# Patient Record
Sex: MALE | Race: OTHER | Employment: FULL TIME | URBAN - METROPOLITAN AREA
[De-identification: names, ages, dates, MRNs, and addresses within clinical notes are randomized per-mention and may not be internally consistent; named-entity substitution may affect disease eponyms.]

---

## 2022-05-01 ENCOUNTER — APPOINTMENT (OUTPATIENT)
Dept: CT IMAGING | Age: 57
End: 2022-05-01
Payer: COMMERCIAL

## 2022-05-01 ENCOUNTER — HOSPITAL ENCOUNTER (EMERGENCY)
Age: 57
Discharge: HOME | End: 2022-05-01
Attending: EMERGENCY MEDICINE
Payer: COMMERCIAL

## 2022-05-01 VITALS
HEIGHT: 72 IN | RESPIRATION RATE: 22 BRPM | HEART RATE: 57 BPM | DIASTOLIC BLOOD PRESSURE: 60 MMHG | OXYGEN SATURATION: 96 % | BODY MASS INDEX: 25.06 KG/M2 | WEIGHT: 185 LBS | SYSTOLIC BLOOD PRESSURE: 94 MMHG | TEMPERATURE: 98.2 F

## 2022-05-01 DIAGNOSIS — F10.920 ACUTE ALCOHOLIC INTOXICATION WITHOUT COMPLICATION (HCC): Primary | ICD-10-CM

## 2022-05-01 LAB
AMPHETAMINE SCREEN, URINE: ABNORMAL
ANION GAP SERPL CALCULATED.3IONS-SCNC: 19 MMOL/L (ref 3–16)
BARBITURATE SCREEN URINE: ABNORMAL
BENZODIAZEPINE SCREEN, URINE: POSITIVE
BUN BLDV-MCNC: 9 MG/DL (ref 7–20)
CALCIUM SERPL-MCNC: 11 MG/DL (ref 8.3–10.6)
CANNABINOID SCREEN URINE: POSITIVE
CHLORIDE BLD-SCNC: 92 MMOL/L (ref 99–110)
CO2: 19 MMOL/L (ref 21–32)
COCAINE METABOLITE SCREEN URINE: ABNORMAL
CREAT SERPL-MCNC: 0.9 MG/DL (ref 0.9–1.3)
EKG ATRIAL RATE: 56 BPM
EKG DIAGNOSIS: NORMAL
EKG P AXIS: 33 DEGREES
EKG P-R INTERVAL: 144 MS
EKG Q-T INTERVAL: 448 MS
EKG QRS DURATION: 78 MS
EKG QTC CALCULATION (BAZETT): 432 MS
EKG R AXIS: 53 DEGREES
EKG T AXIS: 47 DEGREES
EKG VENTRICULAR RATE: 56 BPM
ETHANOL: 309 MG/DL (ref 0–0.08)
GFR AFRICAN AMERICAN: >60
GFR NON-AFRICAN AMERICAN: >60
GLUCOSE BLD-MCNC: 100 MG/DL (ref 70–99)
Lab: ABNORMAL
METHADONE SCREEN, URINE: ABNORMAL
OPIATE SCREEN URINE: ABNORMAL
OXYCODONE URINE: ABNORMAL
PH UA: 6
PHENCYCLIDINE SCREEN URINE: ABNORMAL
POTASSIUM REFLEX MAGNESIUM: 4.3 MMOL/L (ref 3.5–5.1)
PROPOXYPHENE SCREEN: ABNORMAL
SODIUM BLD-SCNC: 130 MMOL/L (ref 136–145)

## 2022-05-01 PROCEDURE — 70450 CT HEAD/BRAIN W/O DYE: CPT

## 2022-05-01 PROCEDURE — 6360000002 HC RX W HCPCS

## 2022-05-01 PROCEDURE — 36415 COLL VENOUS BLD VENIPUNCTURE: CPT

## 2022-05-01 PROCEDURE — 96372 THER/PROPH/DIAG INJ SC/IM: CPT

## 2022-05-01 PROCEDURE — 99284 EMERGENCY DEPT VISIT MOD MDM: CPT

## 2022-05-01 PROCEDURE — 80048 BASIC METABOLIC PNL TOTAL CA: CPT

## 2022-05-01 PROCEDURE — 82077 ASSAY SPEC XCP UR&BREATH IA: CPT

## 2022-05-01 PROCEDURE — 72125 CT NECK SPINE W/O DYE: CPT

## 2022-05-01 PROCEDURE — 6360000002 HC RX W HCPCS: Performed by: EMERGENCY MEDICINE

## 2022-05-01 PROCEDURE — 80307 DRUG TEST PRSMV CHEM ANLYZR: CPT

## 2022-05-01 RX ORDER — MIDAZOLAM HYDROCHLORIDE 2 MG/2ML
5 INJECTION, SOLUTION INTRAMUSCULAR; INTRAVENOUS ONCE
Status: COMPLETED | OUTPATIENT
Start: 2022-05-01 | End: 2022-05-01

## 2022-05-01 RX ORDER — HALOPERIDOL 5 MG/ML
5 INJECTION INTRAMUSCULAR ONCE
Status: COMPLETED | OUTPATIENT
Start: 2022-05-01 | End: 2022-05-01

## 2022-05-01 RX ORDER — DIPHENHYDRAMINE HYDROCHLORIDE 50 MG/ML
INJECTION INTRAMUSCULAR; INTRAVENOUS
Status: COMPLETED
Start: 2022-05-01 | End: 2022-05-01

## 2022-05-01 RX ORDER — LORAZEPAM 2 MG/ML
2 INJECTION INTRAMUSCULAR EVERY 6 HOURS PRN
Status: DISCONTINUED | OUTPATIENT
Start: 2022-05-01 | End: 2022-05-01

## 2022-05-01 RX ORDER — LORAZEPAM 2 MG/ML
2 INJECTION INTRAMUSCULAR ONCE
Status: COMPLETED | OUTPATIENT
Start: 2022-05-01 | End: 2022-05-01

## 2022-05-01 RX ORDER — DIPHENHYDRAMINE HYDROCHLORIDE 50 MG/ML
50 INJECTION INTRAMUSCULAR; INTRAVENOUS ONCE
Status: COMPLETED | OUTPATIENT
Start: 2022-05-01 | End: 2022-05-01

## 2022-05-01 RX ORDER — HALOPERIDOL 5 MG/ML
INJECTION INTRAMUSCULAR
Status: COMPLETED
Start: 2022-05-01 | End: 2022-05-01

## 2022-05-01 RX ORDER — LORAZEPAM 2 MG/ML
INJECTION INTRAMUSCULAR
Status: COMPLETED
Start: 2022-05-01 | End: 2022-05-01

## 2022-05-01 RX ORDER — MIDAZOLAM HYDROCHLORIDE 2 MG/2ML
5 INJECTION, SOLUTION INTRAMUSCULAR; INTRAVENOUS ONCE
Status: DISCONTINUED | OUTPATIENT
Start: 2022-05-01 | End: 2022-05-01 | Stop reason: HOSPADM

## 2022-05-01 RX ADMIN — LORAZEPAM 2 MG: 2 INJECTION INTRAMUSCULAR; INTRAVENOUS at 01:28

## 2022-05-01 RX ADMIN — LORAZEPAM 2 MG: 2 INJECTION INTRAMUSCULAR at 01:28

## 2022-05-01 RX ADMIN — DIPHENHYDRAMINE HYDROCHLORIDE 50 MG: 50 INJECTION INTRAMUSCULAR; INTRAVENOUS at 01:29

## 2022-05-01 RX ADMIN — HALOPERIDOL LACTATE 5 MG: 5 INJECTION, SOLUTION INTRAMUSCULAR at 01:23

## 2022-05-01 RX ADMIN — HALOPERIDOL LACTATE 5 MG: 5 INJECTION, SOLUTION INTRAMUSCULAR at 01:42

## 2022-05-01 RX ADMIN — DIPHENHYDRAMINE HYDROCHLORIDE 50 MG: 50 INJECTION, SOLUTION INTRAMUSCULAR; INTRAVENOUS at 01:29

## 2022-05-01 RX ADMIN — MIDAZOLAM HYDROCHLORIDE 5 MG: 1 INJECTION, SOLUTION INTRAMUSCULAR; INTRAVENOUS at 01:43

## 2022-05-01 RX ADMIN — HALOPERIDOL 5 MG: 5 INJECTION INTRAMUSCULAR at 01:23

## 2022-05-01 RX ADMIN — HALOPERIDOL 5 MG: 5 INJECTION INTRAMUSCULAR at 01:42

## 2022-05-01 ASSESSMENT — PAIN - FUNCTIONAL ASSESSMENT: PAIN_FUNCTIONAL_ASSESSMENT: 0-10

## 2022-05-01 ASSESSMENT — PAIN SCALES - GENERAL: PAINLEVEL_OUTOF10: 0

## 2022-05-01 ASSESSMENT — LIFESTYLE VARIABLES
HOW OFTEN DO YOU HAVE A DRINK CONTAINING ALCOHOL: PATIENT DECLINED
HOW MANY STANDARD DRINKS CONTAINING ALCOHOL DO YOU HAVE ON A TYPICAL DAY: PATIENT DECLINED

## 2022-05-01 NOTE — ED NOTES
Report received from [facility]. Resting in bed, lying on left side. Vitals stable at this time.      Leonardo Maza RN  05/01/22 7937

## 2022-05-01 NOTE — ED PROVIDER NOTES
This patient was seen by the Mid-Level Provider. I have seen and examined the patient, agree with the workup, evaluation, management and diagnosis. Care plan has been discussed. My assessment reveals a 77-year-old male who presented intoxicated. This is a 77-year-old male turned over to me by Dr. Nighat Mendoza, please see his note for further details. Patient was seen earlier evening by midlevel Marci Cox and Dr. Valente Dorman. Apparently the patient was found running around naked at a local hotel and intoxicated and brought here for further evaluation. The patient was turned over to me while he was sleeping to be reevaluated when he woke up. The patient's initial work-up showed a blood alcohol of over 300 and a CT that was unremarkable and negative. The urine drug profile was positive for benzodiazepines and cannabis. Apparently, the patient initially had to be sedated because he was combative. Radiology results:    CT CERVICAL SPINE WO CONTRAST   Final Result   No acute abnormality of the cervical spine. CT HEAD WO CONTRAST   Final Result   No acute intracranial abnormality. LABS:    Labs Reviewed   URINE DRUG SCREEN - Abnormal; Notable for the following components:       Result Value    Benzodiazepine Screen, Urine POSITIVE (*)     Cannabinoid Scrn, Ur POSITIVE (*)     All other components within normal limits   BASIC METABOLIC PANEL W/ REFLEX TO MG FOR LOW K - Abnormal; Notable for the following components:    Sodium 130 (*)     Chloride 92 (*)     CO2 19 (*)     Anion Gap 19 (*)     Glucose 100 (*)     Calcium 11.0 (*)     All other components within normal limits   ETHANOL           EKG:    Sinus bradycardia at a rate of 56 beats a minute with no acute ST elevations or depressions or pathologic Q waves. Exam:    Well-nourished male in no acute distress. He was initially sleeping. The patient had a small abrasion on his left forehead area.   After my initial evaluation I observed the patient for over 48 hours and he is now walking around the ER and going to the bathroom. On reexamination he is conversive, very nice and alert and oriented x3. He is ambulating normally with no focal findings. Medical decision making:    Well-nourished male in no acute distress. He was initially sleeping. The patient had a small abrasion on his left forehead area. After my initial evaluation I observed the patient for over 48 hours and he is now walking around the ER and going to the bathroom. On reexamination he is conversive, very nice and alert and oriented x3. He admits to drinking too much vodka last evening. He denies any other complaints. He is neurologically intact alert and oriented x3. We are helping the patient to find transportation back to his facility. He is to return for any other problems or worsening symptoms. FINAL IMPRESSION:    1.  Acute alcoholic intoxication without complication ([facility].)           Shelbi Thomas MD  05/01/22 1049

## 2022-05-01 NOTE — ED NOTES
Patient yelling hello. RN at bedside. Patient sitting up asking what is going on. Patient informed the events that took place. He is able to state his name, , and where he lives. Given gown and pants. Patient able to walk to bathroom - steady on feet. Dr. Sena Fuentes informed and is at bedside.       Buzz Robertson RN  22 [phone]

## 2022-05-01 NOTE — ED NOTES
Patient discharged in stable condition. Instructions reviewed. Given opportunity to ask questions if needed and patient verbalized understanding. All questions answered. Patient walked to lobby waiting for his friend to pick him up.         Charlee Del Angel RN  05/01/22 2615

## 2022-05-01 NOTE — ED NOTES
IV removed from R hand. No complications. Patient attempting to make phone calls for .       Melissa Moffett RN  05/01/22 9931

## 2022-05-01 NOTE — ED PROVIDER NOTES
[facility]        Pt Name: Antonio Ferrer  MRN: [id number]  [place] 1965  Date of evaluation: 5/1/2022  Provider: Yahir Fernández PA-C  PCP: No primary care provider on file. Note Started: 2:27 AM EDT        I have seen and evaluated this patient with my supervising physician Rolf Cast MD.    [facility]       Chief Complaint   Patient presents with    Alcohol Intoxication     Pt found running naked at [address] near room 134. Per [employer] squad all VSS. Unsure of Pt ID at this time. HISTORY OF PRESENT ILLNESS   (Location, Timing/Onset, Context/Setting, Quality, Duration, Modifying Factors, Severity, Associated Signs and Symptoms)  Note limiting factors. Chief Complaint: Altered mental status    Antonio Ferrer is a 62 y.o. male who presents to the emergency department today for evaluation for altered mental status, patient arrives by EMS, patient apparently was running naked in the [address], concern for polysubstance abuse. Patient does have an abrasion noted to the left forehead, however he is unsure what happened to him. Patient refuses to tell me his name, birthday, he refuses to tell me what substances he is taking tonight, just endorses alcohol. No other history is able to be obtained    Nursing Notes were all reviewed and agreed with or any disagreements were addressed in the HPI. REVIEW OF SYSTEMS    (2-9 systems for level 4, 10 or more for level 5)     Review of Systems   Unable to perform ROS: Mental status change       Positives and Pertinent negatives as per HPI. Except as noted above in the ROS, all other systems were reviewed and negative. PAST MEDICAL HISTORY   History reviewed. No pertinent past medical history. SURGICAL HISTORY   History reviewed. No pertinent surgical history.       CURRENTMEDICATIONS       There are no discharge medications for this patient. ALLERGIES     Patient has no known allergies. FAMILYHISTORY     History reviewed. No pertinent family history. SOCIAL HISTORY       Social History     Tobacco Use    Smoking status: Never Smoker    Smokeless tobacco: Never Used   Vaping Use    Vaping Use: Never used   Substance Use Topics    Alcohol use: Yes     Comment: Keeps stating 2 secs    Drug use: Never       SCREENINGS    [place] Coma Scale  Eye Opening: Spontaneous  Best Verbal Response: Oriented  Best Motor Response: Obeys commands  [place]fe Coma Scale Score: 15        PHYSICAL EXAM    (up to 7 for level 4, 8 or more for level 5)     ED Triage Vitals [05/01/22 0113]   BP Temp Temp Source Pulse Resp SpO2 Height Weight   (!) 144/94 98.2 °F (36.8 °C) Oral 85 16 97 % 6' (1.829 m) 185 lb (83.9 kg)       Physical Exam  Vitals and nursing note reviewed. Constitutional:       Appearance: He is well-developed. He is not diaphoretic. HENT:      Head: Normocephalic and atraumatic. Comments: Hematoma noted to the left forehead, no overlying abrasion. Right Ear: External ear normal.      Left Ear: External ear normal.      Nose: Nose normal.   Eyes:      General:         Right eye: No discharge. Left eye: No discharge. Extraocular Movements: Extraocular movements intact. Conjunctiva/sclera: Conjunctivae normal.      Pupils: Pupils are equal, round, and reactive to light. Neck:      Trachea: No tracheal deviation. Cardiovascular:      Rate and Rhythm: Normal rate and regular rhythm. Pulmonary:      Effort: Pulmonary effort is normal. No respiratory distress. Breath sounds: Normal breath sounds. No wheezing or rales. Abdominal:      General: Bowel sounds are normal. There is no distension. Palpations: Abdomen is soft. Tenderness: There is no abdominal tenderness. There is no guarding. Musculoskeletal:         General: Normal range of motion.       Cervical back: Normal range of motion and neck supple. Skin:     General: Skin is warm and dry. Neurological:      General: No focal deficit present. Mental Status: He is alert. Comments: Equally moving all extremities without any difficulty. Psychiatric:         Behavior: Behavior normal.         DIAGNOSTIC RESULTS   LABS:    Labs Reviewed   URINE DRUG SCREEN - Abnormal; Notable for the following components:       Result Value    Benzodiazepine Screen, Urine POSITIVE (*)     Cannabinoid Scrn, Ur POSITIVE (*)     All other components within normal limits   BASIC METABOLIC PANEL W/ REFLEX TO MG FOR LOW K - Abnormal; Notable for the following components:    Sodium 130 (*)     Chloride 92 (*)     CO2 19 (*)     Anion Gap 19 (*)     Glucose 100 (*)     Calcium 11.0 (*)     All other components within normal limits   ETHANOL       When ordered only abnormal lab results are displayed. All other labs were within normal range or not returned as of this dictation. EKG: When ordered, EKG's are interpreted by the Emergency Department Physician in the absence of a cardiologist.  Please see their note for interpretation of EKG. RADIOLOGY:   Non-plain film images such as CT, Ultrasound and MRI are read by the radiologist. Plain radiographic images are visualized and preliminarily interpreted by the ED Provider with the below findings:        Interpretation per the Radiologist below, if available at the time of this note:    CT CERVICAL SPINE WO CONTRAST   Final Result   No acute abnormality of the cervical spine. CT HEAD WO CONTRAST   Final Result   No acute intracranial abnormality. No results found.         PROCEDURES   Unless otherwise noted below, none     Procedures    CRITICAL CARE TIME       CONSULTS:  None      EMERGENCY DEPARTMENT COURSE and DIFFERENTIAL DIAGNOSIS/MDM:   Vitals:    Vitals:    05/01/22 0645 05/01/22 0653 05/01/22 0715 05/01/22 1004   BP: 93/61  106/89 94/60   Pulse: 52 54 58 57   Resp: 17 19 19 22   Temp: TempSrc:       SpO2: 98% 97% 100% 96%   Weight:       Height:           Patient was given the following medications:  Medications   diphenhydrAMINE (BENADRYL) injection 50 mg (50 mg IntraMUSCular Given 5/1/22 0129)   haloperidol lactate (HALDOL) injection 5 mg (5 mg IntraMUSCular Given 5/1/22 0123)   LORazepam (ATIVAN) injection 2 mg (2 mg IntraMUSCular Given 5/1/22 0128)   haloperidol lactate (HALDOL) injection 5 mg (5 mg IntraMUSCular Given 5/1/22 0142)   midazolam PF (VERSED) injection 5 mg (5 mg IntraMUSCular Given 5/1/22 0143)           Briefly, this is a male who presents to the emergency department today for evaluation of altered mental status. Patient apparently was found at the use and running naked through the hallway. Patient does endorse alcohol tonight, but refuses to tell me any other information    On examination, pupils are equal round reactive to light. He does have an abrasion noted to the left forehead, otherwise is atraumatic. Patient is moving all extremities without difficulty. He is repeatedly trying to take the seatbelt off of the stretcher, and trying to run out of the emergency room. The patient therefore was given medications for protection of himself as well as staff members    Drug screen was positive for benzodiazepines and cannabinoids. His BMP is unremarkable. Ethanol 309, patient will need to be observed until clinically sober. This marks the end of my shift, please see attending note for details and final disposition  FINAL IMPRESSION      1. Acute alcoholic intoxication without complication [facility])          DISPOSITION/PLAN   DISPOSITION Decision To Discharge 05/01/2022 11:28:57 AM      PATIENT REFERRED TO:  See your doctor in next several days for a recheck    Call in 2 days  As needed      DISCHARGE MEDICATIONS:  There are no discharge medications for this patient. DISCONTINUED MEDICATIONS:  There are no discharge medications for this patient.              (Please note that portions of this note were completed with a voice recognition program.  Efforts were made to edit the dictations but occasionally words are mis-transcribed.)    Ana Lilia Beasley PA-C (electronically signed)            Ana Lilia Beasley PA-C  05/01/22 1911

## 2022-05-01 NOTE — ED TRIAGE NOTES
Pt states his mom is Creig Ready. .. He has made a statement once that his name is Lorna Caruso. Still unsure of birth date at this point.

## 2022-05-15 NOTE — ED PROVIDER NOTES
In addition to the advanced practice provider, I personally saw [facility] and performed a substantive portion of the visit including all aspects of the medical decision making. Briefly, this is a 62 y.o. male here for ER for evaluation of acute delirium with suspected alcohol and polysubstance abuse, was found running around a hotel naked, without evidence of trauma. Emotionally labile, mild combativeness at times. Mild scant hematoma adjacent to forehead. No other long bone deformities Limited history due to the patient's intoxication              Screenings   Feliciano Coma Scale  Eye Opening: Spontaneous  Best Verbal Response: Oriented  Best Motor Response: Obeys commands  [place] Coma Scale Score: 15        MDM  Patient presents the ER for evaluation of acute alcohol intoxication without evidence of intracranial hemorrhage, tox he was positive for marijuana, as well as benzodiazepines, he did require agitation control in the ER, he had no hypoxia and no seizures no intracranial hemorrhage. Stable for outpatient management counseled regards to polysubstance abuse    Patient Referrals:  See your doctor in next several days for a recheck    Call in 2 days  As needed      Discharge Medications: There are no discharge medications for this patient. FINAL IMPRESSION  1. Acute alcoholic intoxication without complication (HCC)        Blood pressure 94/60, pulse 57, temperature 98.2 °F (36.8 °C), temperature source Oral, resp. rate 22, height 6' (1.829 m), weight 185 lb (83.9 kg), SpO2 96 %.      For further details of [facility] emergency department encounter, please see documentation by advanced practice provider,     Ana Humphrey MD (electronically signed)  Attending Emergency Physician     Maria Esther Saini MD  15/32/39 [phone]